# Patient Record
Sex: FEMALE | Race: WHITE | NOT HISPANIC OR LATINO | Employment: FULL TIME | URBAN - METROPOLITAN AREA
[De-identification: names, ages, dates, MRNs, and addresses within clinical notes are randomized per-mention and may not be internally consistent; named-entity substitution may affect disease eponyms.]

---

## 2024-02-28 ENCOUNTER — OFFICE VISIT (OUTPATIENT)
Dept: URGENT CARE | Facility: CLINIC | Age: 39
End: 2024-02-28
Payer: COMMERCIAL

## 2024-02-28 VITALS
SYSTOLIC BLOOD PRESSURE: 126 MMHG | WEIGHT: 165 LBS | DIASTOLIC BLOOD PRESSURE: 78 MMHG | BODY MASS INDEX: 27.49 KG/M2 | TEMPERATURE: 98.9 F | HEART RATE: 102 BPM | OXYGEN SATURATION: 99 % | HEIGHT: 65 IN | RESPIRATION RATE: 18 BRPM

## 2024-02-28 DIAGNOSIS — M54.42 ACUTE LEFT-SIDED LOW BACK PAIN WITH LEFT-SIDED SCIATICA: ICD-10-CM

## 2024-02-28 PROCEDURE — 99214 OFFICE O/P EST MOD 30 MIN: CPT | Performed by: NURSE PRACTITIONER

## 2024-02-28 PROCEDURE — 3074F SYST BP LT 130 MM HG: CPT | Performed by: NURSE PRACTITIONER

## 2024-02-28 PROCEDURE — 3078F DIAST BP <80 MM HG: CPT | Performed by: NURSE PRACTITIONER

## 2024-02-28 RX ORDER — AMITRIPTYLINE HYDROCHLORIDE 10 MG/1
10 TABLET, FILM COATED ORAL NIGHTLY
Qty: 30 TABLET | Refills: 0 | Status: SHIPPED | OUTPATIENT
Start: 2024-02-28 | End: 2024-03-29

## 2024-02-28 RX ORDER — GABAPENTIN 300 MG/1
300 CAPSULE ORAL 3 TIMES DAILY
Qty: 90 CAPSULE | Refills: 0 | Status: SHIPPED | OUTPATIENT
Start: 2024-02-28 | End: 2024-03-29

## 2024-02-28 RX ORDER — METHYLPREDNISOLONE 4 MG/1
TABLET ORAL
Qty: 21 TABLET | Refills: 0 | Status: SHIPPED | OUTPATIENT
Start: 2024-02-28

## 2024-02-28 RX ORDER — CYCLOBENZAPRINE HCL 5 MG
5-10 TABLET ORAL 3 TIMES DAILY PRN
Qty: 30 TABLET | Refills: 0 | Status: SHIPPED | OUTPATIENT
Start: 2024-02-28

## 2024-02-28 ASSESSMENT — ENCOUNTER SYMPTOMS
CONSTITUTIONAL NEGATIVE: 1
EYES NEGATIVE: 1
NEUROLOGICAL NEGATIVE: 1
PSYCHIATRIC NEGATIVE: 1
GASTROINTESTINAL NEGATIVE: 1
CARDIOVASCULAR NEGATIVE: 1
RESPIRATORY NEGATIVE: 1
BACK PAIN: 1

## 2024-02-28 NOTE — PROGRESS NOTES
Subjective:   Dalila Martinez is a 38 y.o. female who presents for Back Pain (Left lower back px that radiates down to left leg, tingling sensation in left leg x 6 days )      Patient presents for evaluation of chronic low back pain with left-sided radicular pain which has been present for 6 days.  Patient reports the pain goes all the way down the back of her leg to her heel and wraps around the top of her foot.  She states it is sharp and stabbing, and debilitating in nature.  She is having a hard time with ambulation.  She has tried stretching, ice, heat to the area but has not had any relief in 6 days.  She is tearful about the amount of pain she is in right now.  Patient has chronic back pain which is normally located on the right side, for which she was medically d/c from the [facility] in 2011.  Patient reports that she has been under the care of of chronic pain management since that time, and [facility]  She is currently traveling to our area right now.  [facility], but they for some reason were unable to see her.  She is on Suboxone currently, and has recently discontinued opiate use last August.  Patient reports that these acute flareups can be managed for her with a regimen of steroid, gabapentin, Elavil and cyclobenzaprine.  She has contacted her pain management physician in [place], who has authorized use of these medications with her current regimen.        Review of Systems   Constitutional: Negative.    HENT: Negative.     Eyes: Negative.    Respiratory: Negative.     Cardiovascular: Negative.    Gastrointestinal: Negative.    Genitourinary: Negative.    Musculoskeletal:  Positive for back pain.        Left low back pain with radiation to the left lower extremity   Skin: Negative.    Neurological: Negative.    Endo/Heme/Allergies: Negative.    Psychiatric/Behavioral: Negative.     All other systems reviewed and are  "negative.      Medications, Allergies, and current problem list reviewed today in Epic.     Objective:     /78   Pulse (!) 102   Temp 37.2 °C (98.9 °F) (Temporal)   Resp 18   Ht 1.651 m (5' 5\")   Wt 74.8 kg (165 lb)   SpO2 99%     Physical Exam  Vitals reviewed.   Constitutional:       General: She is not in acute distress.     Appearance: Normal appearance. She is not ill-appearing.   HENT:      Head: Normocephalic and atraumatic.      Nose: Nose normal.      Mouth/Throat:      Mouth: Mucous membranes are moist.      Pharynx: Oropharynx is clear.   Eyes:      Extraocular Movements: Extraocular movements intact.      Conjunctiva/sclera: Conjunctivae normal.      Pupils: Pupils are equal, round, and reactive to light.   Cardiovascular:      Rate and Rhythm: Normal rate and regular rhythm.   Pulmonary:      Effort: Pulmonary effort is normal.      Breath sounds: Normal breath sounds.   Abdominal:      General: Abdomen is flat.      Palpations: Abdomen is soft.   Musculoskeletal:      Cervical back: Normal, normal range of motion and neck supple.      Thoracic back: Normal.      Lumbar back: Spasms and tenderness present. No swelling, edema, deformity, signs of trauma, lacerations or bony tenderness. Decreased range of motion. Positive left straight leg raise test. Negative right straight leg raise test. No scoliosis.   Skin:     General: Skin is warm and dry.      Capillary Refill: Capillary refill takes less than 2 seconds.   Neurological:      General: No focal deficit present.      Mental Status: She is alert.   Psychiatric:         Mood and Affect: Mood normal.         Behavior: Behavior normal.         Assessment/Plan:     Diagnosis and associated orders:     1. Acute left-sided low back pain with left-sided sciatica  methylPREDNISolone (MEDROL DOSEPAK) 4 MG Tablet Therapy Pack    gabapentin (NEURONTIN) 300 MG Cap    amitriptyline (ELAVIL) 10 MG Tab    cyclobenzaprine (FLEXERIL) 5 mg tablet       "   Comments/MDM:     Suspect patient has acute on-chronic mechanical low back pain with spasm and radicular features.  Patient has been through formal PT and does have a home exercise program.  Patient was given medications as noted above, which she has taken in the past to help her acute flare up. Patient will go to the ER for development of any loss of bowel or bladder, or saddle anesthesia.   Follow up with PCP within one week.            Differential diagnosis, natural history, supportive care, and indications for immediate follow-up discussed.    Advised the patient to follow-up with the primary care physician for recheck, reevaluation, and consideration of further management.    Please note that this dictation was created using voice recognition software. I have made a reasonable attempt to correct obvious errors, but I expect that there are errors of grammar and possibly content that I did not discover before finalizing the note.    This note was electronically signed by TENNILLE Hayward